# Patient Record
Sex: FEMALE | Race: BLACK OR AFRICAN AMERICAN | NOT HISPANIC OR LATINO | Employment: PART TIME | URBAN - METROPOLITAN AREA
[De-identification: names, ages, dates, MRNs, and addresses within clinical notes are randomized per-mention and may not be internally consistent; named-entity substitution may affect disease eponyms.]

---

## 2023-06-03 DIAGNOSIS — Z36.2 ENCOUNTER FOR FOLLOW-UP ULTRASOUND OF FETAL ANATOMY: Primary | ICD-10-CM

## 2023-06-09 ENCOUNTER — PROCEDURE VISIT (OUTPATIENT)
Dept: MATERNAL FETAL MEDICINE | Facility: CLINIC | Age: 38
End: 2023-06-09
Payer: MEDICAID

## 2023-06-09 ENCOUNTER — OFFICE VISIT (OUTPATIENT)
Dept: MATERNAL FETAL MEDICINE | Facility: CLINIC | Age: 38
End: 2023-06-09
Payer: MEDICAID

## 2023-06-09 VITALS
BODY MASS INDEX: 27.31 KG/M2 | DIASTOLIC BLOOD PRESSURE: 67 MMHG | SYSTOLIC BLOOD PRESSURE: 112 MMHG | WEIGHT: 160 LBS | HEART RATE: 87 BPM | HEIGHT: 64 IN

## 2023-06-09 DIAGNOSIS — O09.522 SUPERVISION OF ELDERLY MULTIGRAVIDA IN SECOND TRIMESTER: ICD-10-CM

## 2023-06-09 DIAGNOSIS — O09.292 IUGR (INTRAUTERINE GROWTH RETARDATION) & STILLBIRTH HISTORY, PREGNANT, SECOND TRIMESTER: ICD-10-CM

## 2023-06-09 DIAGNOSIS — E66.3 OVERWEIGHT (BMI 25.0-29.9): ICD-10-CM

## 2023-06-09 DIAGNOSIS — O09.212 SUPERVISION OF PREGNANCY WITH HISTORY OF PRE-TERM LABOR IN SECOND TRIMESTER: ICD-10-CM

## 2023-06-09 DIAGNOSIS — Z36.2 ENCOUNTER FOR FOLLOW-UP ULTRASOUND OF FETAL ANATOMY: ICD-10-CM

## 2023-06-09 DIAGNOSIS — O09.90 AT HIGH RISK FOR COMPLICATIONS OF INTRAUTERINE PREGNANCY (IUP): ICD-10-CM

## 2023-06-09 DIAGNOSIS — O09.292 IUGR (INTRAUTERINE GROWTH RETARDATION) & STILLBIRTH HISTORY, PREGNANT, SECOND TRIMESTER: Primary | ICD-10-CM

## 2023-06-09 PROCEDURE — 3078F DIAST BP <80 MM HG: CPT | Mod: CPTII,S$GLB,, | Performed by: OBSTETRICS & GYNECOLOGY

## 2023-06-09 PROCEDURE — 99213 PR OFFICE/OUTPT VISIT, EST, LEVL III, 20-29 MIN: ICD-10-PCS | Mod: TH,S$GLB,, | Performed by: OBSTETRICS & GYNECOLOGY

## 2023-06-09 PROCEDURE — 1159F MED LIST DOCD IN RCRD: CPT | Mod: CPTII,S$GLB,, | Performed by: OBSTETRICS & GYNECOLOGY

## 2023-06-09 PROCEDURE — 3074F PR MOST RECENT SYSTOLIC BLOOD PRESSURE < 130 MM HG: ICD-10-PCS | Mod: CPTII,S$GLB,, | Performed by: OBSTETRICS & GYNECOLOGY

## 2023-06-09 PROCEDURE — 99213 OFFICE O/P EST LOW 20 MIN: CPT | Mod: TH,S$GLB,, | Performed by: OBSTETRICS & GYNECOLOGY

## 2023-06-09 PROCEDURE — 76816 PR  US,PREGNANT UTERUS,F/U,TRANSABD APP: ICD-10-PCS | Mod: S$GLB,,, | Performed by: OBSTETRICS & GYNECOLOGY

## 2023-06-09 PROCEDURE — 3008F PR BODY MASS INDEX (BMI) DOCUMENTED: ICD-10-PCS | Mod: CPTII,S$GLB,, | Performed by: OBSTETRICS & GYNECOLOGY

## 2023-06-09 PROCEDURE — 3074F SYST BP LT 130 MM HG: CPT | Mod: CPTII,S$GLB,, | Performed by: OBSTETRICS & GYNECOLOGY

## 2023-06-09 PROCEDURE — 3078F PR MOST RECENT DIASTOLIC BLOOD PRESSURE < 80 MM HG: ICD-10-PCS | Mod: CPTII,S$GLB,, | Performed by: OBSTETRICS & GYNECOLOGY

## 2023-06-09 PROCEDURE — 76816 OB US FOLLOW-UP PER FETUS: CPT | Mod: S$GLB,,, | Performed by: OBSTETRICS & GYNECOLOGY

## 2023-06-09 PROCEDURE — 1159F PR MEDICATION LIST DOCUMENTED IN MEDICAL RECORD: ICD-10-PCS | Mod: CPTII,S$GLB,, | Performed by: OBSTETRICS & GYNECOLOGY

## 2023-06-09 PROCEDURE — 3008F BODY MASS INDEX DOCD: CPT | Mod: CPTII,S$GLB,, | Performed by: OBSTETRICS & GYNECOLOGY

## 2023-06-09 RX ORDER — PNV NO.95/FERROUS FUM/FOLIC AC 28MG-0.8MG
1 TABLET ORAL
COMMUNITY
Start: 2023-04-12

## 2023-06-09 RX ORDER — ONDANSETRON 8 MG/1
8 TABLET, ORALLY DISINTEGRATING ORAL EVERY 6 HOURS PRN
COMMUNITY
Start: 2023-04-13

## 2023-06-09 RX ORDER — ASPIRIN 81 MG/1
81 TABLET ORAL DAILY
COMMUNITY

## 2023-06-09 RX ORDER — FERROUS SULFATE TAB 325 MG (65 MG ELEMENTAL FE) 325 (65 FE) MG
TAB ORAL
COMMUNITY
Start: 2023-04-12

## 2023-06-09 NOTE — ASSESSMENT & PLAN NOTE
Overweight with BMI 26 at consult visit, relatively stable  Continue eating healthy and limiting weight gain to 15-25lbs during the pregnancy, as optimal in this situation. Excess weight gain would be associated with gestational hypertension, gestational diabetes and adverse  outcomes, including fetal demise in utero.

## 2023-06-09 NOTE — ASSESSMENT & PLAN NOTE
History of FGR  Will do serial ultrasound to monitor growth. If any evidence of slowing fetal growth, closer fetal surveillance to be done.

## 2023-06-09 NOTE — ASSESSMENT & PLAN NOTE
Previous  delivery, currently with +FHT per ultrasound    Previously discussed updated guidelines for management of previous  delivery. With CL greater than 3 cm, she decided to proceed with cervical surveillance only, that ruled out cervical insufficiency.  She decided to avoid any progesterone.  No current symptoms of  labor.    PTL precautions reviewed.

## 2023-06-09 NOTE — ASSESSMENT & PLAN NOTE
Preeclampsia prophylaxis    Continue low dose aspirin daily until delivery. Preeclampsia precautions reviewed.

## 2023-06-09 NOTE — ASSESSMENT & PLAN NOTE
Normal fetal growth with estimated fetal weight of 955 g at the 59th percentile.  She was previously offered and declined genetic amniocentesis. She had negative cfDNA. She is aware of need for routine  evaluation.    Will do serial ultrasounds to monitor growth until the end of the pregnancy. Will plan to recheck growth in about 6 weeks. Fetal testing, if no other risk factors are present, could be done around 32 weeks in view of the higher risk of fetal demise in utero closer to term with advanced maternal age.

## 2023-07-21 ENCOUNTER — PROCEDURE VISIT (OUTPATIENT)
Dept: MATERNAL FETAL MEDICINE | Facility: CLINIC | Age: 38
End: 2023-07-21
Payer: MEDICAID

## 2023-07-21 ENCOUNTER — OFFICE VISIT (OUTPATIENT)
Dept: MATERNAL FETAL MEDICINE | Facility: CLINIC | Age: 38
End: 2023-07-21
Payer: MEDICAID

## 2023-07-21 VITALS
DIASTOLIC BLOOD PRESSURE: 70 MMHG | HEART RATE: 106 BPM | BODY MASS INDEX: 27.83 KG/M2 | HEIGHT: 64 IN | SYSTOLIC BLOOD PRESSURE: 98 MMHG | WEIGHT: 163 LBS

## 2023-07-21 DIAGNOSIS — O09.292 IUGR (INTRAUTERINE GROWTH RETARDATION) & STILLBIRTH HISTORY, PREGNANT, SECOND TRIMESTER: Primary | ICD-10-CM

## 2023-07-21 DIAGNOSIS — O09.292 IUGR (INTRAUTERINE GROWTH RETARDATION) & STILLBIRTH HISTORY, PREGNANT, SECOND TRIMESTER: ICD-10-CM

## 2023-07-21 DIAGNOSIS — O09.212 SUPERVISION OF PREGNANCY WITH HISTORY OF PRE-TERM LABOR IN SECOND TRIMESTER: ICD-10-CM

## 2023-07-21 DIAGNOSIS — O09.522 SUPERVISION OF ELDERLY MULTIGRAVIDA IN SECOND TRIMESTER: ICD-10-CM

## 2023-07-21 PROCEDURE — 1160F PR REVIEW ALL MEDS BY PRESCRIBER/CLIN PHARMACIST DOCUMENTED: ICD-10-PCS | Mod: CPTII,S$GLB,, | Performed by: OBSTETRICS & GYNECOLOGY

## 2023-07-21 PROCEDURE — 3078F PR MOST RECENT DIASTOLIC BLOOD PRESSURE < 80 MM HG: ICD-10-PCS | Mod: CPTII,S$GLB,, | Performed by: OBSTETRICS & GYNECOLOGY

## 2023-07-21 PROCEDURE — 3074F SYST BP LT 130 MM HG: CPT | Mod: CPTII,S$GLB,, | Performed by: OBSTETRICS & GYNECOLOGY

## 2023-07-21 PROCEDURE — 99213 PR OFFICE/OUTPT VISIT, EST, LEVL III, 20-29 MIN: ICD-10-PCS | Mod: TH,25,S$GLB, | Performed by: OBSTETRICS & GYNECOLOGY

## 2023-07-21 PROCEDURE — 3078F DIAST BP <80 MM HG: CPT | Mod: CPTII,S$GLB,, | Performed by: OBSTETRICS & GYNECOLOGY

## 2023-07-21 PROCEDURE — 99213 OFFICE O/P EST LOW 20 MIN: CPT | Mod: TH,25,S$GLB, | Performed by: OBSTETRICS & GYNECOLOGY

## 2023-07-21 PROCEDURE — 1159F MED LIST DOCD IN RCRD: CPT | Mod: CPTII,S$GLB,, | Performed by: OBSTETRICS & GYNECOLOGY

## 2023-07-21 PROCEDURE — 76819 FETAL BIOPHYS PROFIL W/O NST: CPT | Mod: S$GLB,,, | Performed by: OBSTETRICS & GYNECOLOGY

## 2023-07-21 PROCEDURE — 3074F PR MOST RECENT SYSTOLIC BLOOD PRESSURE < 130 MM HG: ICD-10-PCS | Mod: CPTII,S$GLB,, | Performed by: OBSTETRICS & GYNECOLOGY

## 2023-07-21 PROCEDURE — 3008F BODY MASS INDEX DOCD: CPT | Mod: CPTII,S$GLB,, | Performed by: OBSTETRICS & GYNECOLOGY

## 2023-07-21 PROCEDURE — 76819 PR US, OB, FETAL BIOPHYSICAL, W/O NST: ICD-10-PCS | Mod: S$GLB,,, | Performed by: OBSTETRICS & GYNECOLOGY

## 2023-07-21 PROCEDURE — 1159F PR MEDICATION LIST DOCUMENTED IN MEDICAL RECORD: ICD-10-PCS | Mod: CPTII,S$GLB,, | Performed by: OBSTETRICS & GYNECOLOGY

## 2023-07-21 PROCEDURE — 3008F PR BODY MASS INDEX (BMI) DOCUMENTED: ICD-10-PCS | Mod: CPTII,S$GLB,, | Performed by: OBSTETRICS & GYNECOLOGY

## 2023-07-21 PROCEDURE — 76816 PR  US,PREGNANT UTERUS,F/U,TRANSABD APP: ICD-10-PCS | Mod: S$GLB,,, | Performed by: OBSTETRICS & GYNECOLOGY

## 2023-07-21 PROCEDURE — 76816 OB US FOLLOW-UP PER FETUS: CPT | Mod: S$GLB,,, | Performed by: OBSTETRICS & GYNECOLOGY

## 2023-07-21 PROCEDURE — 1160F RVW MEDS BY RX/DR IN RCRD: CPT | Mod: CPTII,S$GLB,, | Performed by: OBSTETRICS & GYNECOLOGY

## 2023-07-21 NOTE — PROGRESS NOTES
Maternal Fetal Medicine follow up consult    Subjective     Patient ID: Malorie Haro is a 38 y.o. female  at 32w0d gestation with Estimated Date of Delivery: 9/15/23  who is here for follow  up consultation by MFM.    Chief Complaint: MFM FOLLOW UP W/US       Pregnancy complications include:   Patient Active Problem List   Diagnosis    Supervision of elderly multigravida in second trimester    hx of FGR  in previous pregnancy, pregnant, second trimester    Supervision of pregnancy with history of pre-term labor in second trimester    At high risk for complications of intrauterine pregnancy (IUP)    (BMI 25.0-29.9)        No changes to medical, surgical, family, social, or obstetric history.      She is of advanced maternal age, will be 38 by the [facility]. She declined amniocentesis and had negative cfDNA. She had previous  delivery at 35 6/7 weeks in her  pregnancy. She had FGR in first pregnancy, with baby 4lb 12 oz at delivery. She had elevated BMI of 26 at consult. She is taking prophylactic low dose aspirin daily.         Interval history since last MFM visit: None.. She denies any leaking fluid, vaginal bleeding, contractions, decreased fetal movement. Denies headaches, visual disturbances, or epigastric pain    Medications:  Current Outpatient Medications   Medication Instructions    aspirin (ECOTRIN) 81 mg, Oral, Daily    FEROSUL 325 mg (65 mg iron) Tab tablet Oral    ondansetron (ZOFRAN-ODT) 8 mg, Oral, Every 6 hours PRN    PRENATAL 28 mg iron- 800 mcg Tab 1 tablet, Oral       Review of Systems   Constitutional:  Negative for activity change.   Eyes:  Negative for visual disturbance.   Respiratory: Negative.     Cardiovascular:  Negative for leg swelling.   Gastrointestinal:  Negative for abdominal pain, constipation, diarrhea, nausea, vomiting and reflux.   Genitourinary:  Negative for bladder incontinence, frequency, pelvic pain, vaginal bleeding and vaginal discharge.        Good  fetal movements   Musculoskeletal:  Negative for back pain.   Neurological:  Negative for headaches.   All other systems reviewed and are negative.        Objective     Vitals:    23 0906   BP: 98/70   Pulse: 106        Physical Exam  Vitals and nursing note reviewed.   Constitutional:       Appearance: Normal appearance.   HENT:      Head: Normocephalic and atraumatic.      Nose: Nose normal. No congestion.   Cardiovascular:      Rate and Rhythm: Normal rate.   Pulmonary:      Effort: Pulmonary effort is normal.   Skin:     Findings: No rash.   Neurological:      Mental Status: She is alert and oriented to person, place, and time.   Psychiatric:         Mood and Affect: Mood normal.         Behavior: Behavior normal.         Thought Content: Thought content normal.         Judgment: Judgment normal.          Assessment and Plan     ASSESSMENT/PLAN:     38 y.o.  female with IUP at 32w0d       Supervision of elderly multigravida in second trimester  There is normal fetal growth (with an EFW of 2002 g at the 36% and the AC at the 72% on 2023).   AFV is normal.   The BPP score is reassuring at 8/8, and the MVP is normal.   She was previously offered and declined genetic amniocentesis. She had negative cfDNA. She is aware of need for routine  evaluation.     Patient could have weekly testing with primary OB and will see her 1 more time in 4 weeks.     Supervision of pregnancy with history of pre-term labor in second trimester  Previous  delivery,       No current symptoms of  labor.     PTL precautions reviewed.     hx of FGR  in previous pregnancy, pregnant, second trimester  History of FGR  Will do serial ultrasound to monitor growth. If any evidence of slowing fetal growth, closer fetal surveillance to be done.     At high risk for complications of intrauterine pregnancy (IUP)  Preeclampsia prophylaxis     Continue low dose aspirin daily until delivery. Preeclampsia  precautions reviewed.     (BMI 25.0-29.9)  Overweight with BMI 26 at consult visit, relatively stable  Continue eating healthy and limiting weight gain to 15-25lbs during the pregnancy, as optimal in this situation. Excess weight gain would be associated with gestational hypertension, gestational diabetes and adverse  outcomes, including fetal demise in utero.                  Follow up in about 4 weeks (around 2023) for MFM follow-up, BPP, Repeat  ultrasound.             Components of this note were documented using voice recognition systems; and are subject to errors not corrected at proof reading.  Please contact the author for any clarifications.

## 2023-08-09 DIAGNOSIS — O09.212 SUPERVISION OF PREGNANCY WITH HISTORY OF PRE-TERM LABOR IN SECOND TRIMESTER: ICD-10-CM

## 2023-08-09 DIAGNOSIS — O09.292 IUGR (INTRAUTERINE GROWTH RETARDATION) & STILLBIRTH HISTORY, PREGNANT, SECOND TRIMESTER: Primary | ICD-10-CM

## 2023-08-09 DIAGNOSIS — O09.522 SUPERVISION OF ELDERLY MULTIGRAVIDA IN SECOND TRIMESTER: ICD-10-CM

## 2023-08-18 ENCOUNTER — TELEPHONE (OUTPATIENT)
Dept: MATERNAL FETAL MEDICINE | Facility: CLINIC | Age: 38
End: 2023-08-18

## 2023-12-11 NOTE — PROGRESS NOTES
Maternal Fetal Medicine follow up consult    Subjective     Patient ID: Malorie Haro is a 38 y.o. female  at 26w0d gestation with Estimated Date of Delivery: 9/15/23  who is here for follow  up consultation by M.    Chief Complaint: M follow up with [place] (AMA and h/o PTD.  )    She is of advanced maternal age, will be 38 by the [facility]. She declined amniocentesis and had negative cfDNA. She had previous  delivery at 35 6/7 weeks in her  pregnancy. She had FGR in first pregnancy, with baby 4lb 12 oz at delivery. She had elevated BMI of 26 at consult. She is taking prophylactic low dose aspirin daily.    Pregnancy complications include:   Patient Active Problem List   Diagnosis    Supervision of elderly multigravida in second trimester    hx of FGR  in previous pregnancy, pregnant, second trimester    Supervision of pregnancy with history of pre-term labor in second trimester    At high risk for complications of intrauterine pregnancy (IUP)    (BMI 25.0-29.9)        No changes to medical, surgical, family, social, or obstetric history.    Interval history since last MFM visit: None.    HPI She denies any leaking fluid, vaginal bleeding, contractions, decreased fetal movement. Denies headaches, visual disturbances, or epigastric pain    Medications:  Current Outpatient Medications   Medication Instructions    aspirin (ECOTRIN) 81 mg, Oral, Daily    FEROSUL 325 mg (65 mg iron) Tab tablet Oral    ondansetron (ZOFRAN-ODT) 8 mg, Oral, Every 6 hours PRN    PRENATAL 28 mg iron- 800 mcg Tab 1 tablet, Oral       Review of Systems   Constitutional:  Negative for activity change.   Eyes:  Negative for visual disturbance.   Respiratory: Negative.     Cardiovascular:  Negative for leg swelling.   Gastrointestinal:  Negative for abdominal pain, constipation, diarrhea, nausea, vomiting and reflux.   Genitourinary:  Negative for bladder incontinence, frequency, pelvic pain, vaginal bleeding and vaginal  Detail Level: Generalized discharge.        Good fetal movements   Musculoskeletal:  Negative for back pain.   Neurological:  Negative for headaches.   All other systems reviewed and are negative.        Objective     Physical Exam  Vitals and nursing note reviewed.   Constitutional:       Appearance: Normal appearance.   HENT:      Head: Normocephalic and atraumatic.      Nose: Nose normal. No congestion.   Eyes:      Conjunctiva/sclera: Conjunctivae normal.   Cardiovascular:      Rate and Rhythm: Normal rate.   Pulmonary:      Effort: Pulmonary effort is normal.   Abdominal:      Palpations: Abdomen is soft.   Skin:     Findings: No bruising or rash.   Neurological:      Mental Status: She is alert and oriented to person, place, and time.   Psychiatric:         Mood and Affect: Mood normal.         Behavior: Behavior normal.         Thought Content: Thought content normal.         Judgment: Judgment normal.          Assessment and Plan     ASSESSMENT/PLAN:     38 y.o.  female with IUP at 26w0d    Supervision of elderly multigravida in second trimester  Normal fetal growth with estimated fetal weight of 955 g at the 59th percentile.  She was previously offered and declined genetic amniocentesis. She had negative cfDNA. She is aware of need for routine  evaluation.    Will do serial ultrasounds to monitor growth until the end of the pregnancy. Will plan to recheck growth in about 6 weeks. Fetal testing, if no other risk factors are present, could be done around 32 weeks in view of the higher risk of fetal demise in utero closer to term with advanced maternal age.    Supervision of pregnancy with history of pre-term labor in second trimester  Previous  delivery, currently with +FHT per ultrasound    Previously discussed updated guidelines for management of previous  delivery. With CL greater than 3 cm, she decided to proceed with cervical surveillance only, that ruled out cervical insufficiency.  She decided to  avoid any progesterone.  No current symptoms of  labor.    PTL precautions reviewed.    hx of FGR  in previous pregnancy, pregnant, second trimester  History of FGR  Will do serial ultrasound to monitor growth. If any evidence of slowing fetal growth, closer fetal surveillance to be done.    At high risk for complications of intrauterine pregnancy (IUP)  Preeclampsia prophylaxis    Continue low dose aspirin daily until delivery. Preeclampsia precautions reviewed.    (BMI 25.0-29.9)  Overweight with BMI 26 at consult visit, relatively stable  Continue eating healthy and limiting weight gain to 15-25lbs during the pregnancy, as optimal in this situation. Excess weight gain would be associated with gestational hypertension, gestational diabetes and adverse  outcomes, including fetal demise in utero.          Follow up in about 6 weeks (around 2023) for MFM follow-up, BPP, Repeat  ultrasound.           Components of this note were documented using voice recognition systems; and are subject to errors not corrected at proof reading.  Please contact the author for any clarifications.